# Patient Record
Sex: FEMALE | Race: WHITE
[De-identification: names, ages, dates, MRNs, and addresses within clinical notes are randomized per-mention and may not be internally consistent; named-entity substitution may affect disease eponyms.]

---

## 2017-08-25 ENCOUNTER — HOSPITAL ENCOUNTER (EMERGENCY)
Dept: HOSPITAL 31 - C.ER | Age: 27
Discharge: HOME | End: 2017-08-25
Payer: SELF-PAY

## 2017-08-25 VITALS — TEMPERATURE: 98.2 F

## 2017-08-25 VITALS — DIASTOLIC BLOOD PRESSURE: 87 MMHG | RESPIRATION RATE: 18 BRPM | SYSTOLIC BLOOD PRESSURE: 120 MMHG | HEART RATE: 70 BPM

## 2017-08-25 VITALS — OXYGEN SATURATION: 100 %

## 2017-08-25 DIAGNOSIS — O20.0: Primary | ICD-10-CM

## 2017-08-25 DIAGNOSIS — Z3A.01: ICD-10-CM

## 2017-08-25 LAB
ALBUMIN/GLOB SERPL: 1 {RATIO} (ref 1–2.1)
ALP SERPL-CCNC: 65 U/L (ref 38–126)
ALT SERPL-CCNC: 44 U/L (ref 9–52)
AST SERPL-CCNC: 32 U/L (ref 14–36)
BACTERIA #/AREA URNS HPF: (no result) /[HPF]
BASOPHILS # BLD AUTO: 0.1 K/UL (ref 0–0.2)
BASOPHILS NFR BLD: 0.6 % (ref 0–2)
BILIRUB SERPL-MCNC: 0.6 MG/DL (ref 0.2–1.3)
BILIRUB UR-MCNC: NEGATIVE MG/DL
BUN SERPL-MCNC: 10 MG/DL (ref 7–17)
CALCIUM SERPL-MCNC: 8.9 MG/DL (ref 8.6–10.4)
CHLORIDE SERPL-SCNC: 101 MMOL/L (ref 98–107)
CO2 SERPL-SCNC: 24 MMOL/L (ref 22–30)
EOSINOPHIL # BLD AUTO: 0.2 K/UL (ref 0–0.7)
EOSINOPHIL NFR BLD: 1.8 % (ref 0–4)
ERYTHROCYTE [DISTWIDTH] IN BLOOD BY AUTOMATED COUNT: 12.8 % (ref 11.5–14.5)
GLOBULIN SER-MCNC: 4 GM/DL (ref 2.2–3.9)
GLUCOSE SERPL-MCNC: 67 MG/DL (ref 65–105)
GLUCOSE UR STRIP-MCNC: NORMAL MG/DL
HCT VFR BLD CALC: 43.8 % (ref 34–47)
KETONES UR STRIP-MCNC: NEGATIVE MG/DL
LEUKOCYTE ESTERASE UR-ACNC: (no result) LEU/UL
LYMPHOCYTES # BLD AUTO: 2.9 K/UL (ref 1–4.3)
LYMPHOCYTES NFR BLD AUTO: 25.1 % (ref 20–40)
MCH RBC QN AUTO: 30.2 PG (ref 27–31)
MCHC RBC AUTO-ENTMCNC: 32.9 G/DL (ref 33–37)
MCV RBC AUTO: 91.8 FL (ref 81–99)
MONOCYTES # BLD: 0.9 K/UL (ref 0–0.8)
MONOCYTES NFR BLD: 8 % (ref 0–10)
NRBC BLD AUTO-RTO: 0 % (ref 0–2)
PH UR STRIP: 7 [PH] (ref 5–8)
PLATELET # BLD: 292 K/UL (ref 130–400)
PMV BLD AUTO: 8.7 FL (ref 7.2–11.7)
POTASSIUM SERPL-SCNC: 4.3 MMOL/L (ref 3.6–5.2)
PROT SERPL-MCNC: 8.1 G/DL (ref 6.3–8.3)
PROT UR STRIP-MCNC: NEGATIVE MG/DL
RBC # UR STRIP: (no result) /UL
RBC #/AREA URNS HPF: < 1 /HPF (ref 0–3)
SODIUM SERPL-SCNC: 137 MMOL/L (ref 132–148)
SP GR UR STRIP: 1.01 (ref 1–1.03)
TRANS CELLS #/AREA URNS HPF: < 1 /HPF (ref 0–3)
UROBILINOGEN UR-MCNC: NORMAL MG/DL (ref 0.2–1)
WBC # BLD AUTO: 11.5 K/UL (ref 4.8–10.8)
WBC #/AREA URNS HPF: < 1 /HPF (ref 0–5)

## 2017-08-25 NOTE — US
EXAM:

  US Pregnancy, Transvaginal



EXAM DATE/TIME:

  8/25/2017 9:18 PM



CLINICAL HISTORY:

  27 years old, female; Signs and symptoms; Lmp or gestational age (in weeks): 

? 6/2017; Antepartum complications; Bleeding; Pregnant; Additional info: Vag 

bleed 8 weeks preg



TECHNIQUE:

  Real-time transvaginal obstetrical ultrasound of the maternal pelvis and a 

first trimester pregnancy with image documentation.  Transvaginal imaging was 

used for better evaluation of the fetus and adnexa.



COMPARISON:

  No relevant prior studies available.



FINDINGS:



  The uterus measures approximately 9 x 4 x 5 cm. The cervix measures 3.3 cm.



   There is an intrauterine gestational sac containing a yolk sac and fetal 

pole.



  Gestational measurements correspond to a gestational age of 6 weeks 4 days.  



  Crown rump length measurements correspond to a gestational age of 6 weeks 1 

day.  



   No fetal heart rate was obtained.



  There is a small hypoechoic structure adjacent to the gestational sac likely 

representing a subchorionic bleed.  Followup is recommended.



  The maternal ovaries are normal bilaterally.



  Color flow and doppler vascular waveforms were demonstrated to both ovaries.





IMPRESSION:   



Intrauterine gestational sac containing yolk sac and fetal pole however no 

fetal heart rate at this time. Recommend short term followup beta-hCG levels 

and ultrasound.



Probable small subchorionic bleed.
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
## 2017-08-31 ENCOUNTER — HOSPITAL ENCOUNTER (EMERGENCY)
Dept: HOSPITAL 31 - C.ER | Age: 27
Discharge: HOME | End: 2017-08-31
Payer: COMMERCIAL

## 2017-08-31 VITALS — OXYGEN SATURATION: 99 % | DIASTOLIC BLOOD PRESSURE: 72 MMHG | HEART RATE: 85 BPM | SYSTOLIC BLOOD PRESSURE: 123 MMHG

## 2017-08-31 VITALS — RESPIRATION RATE: 16 BRPM | TEMPERATURE: 98.2 F

## 2017-08-31 VITALS — BODY MASS INDEX: 27.6 KG/M2

## 2017-08-31 DIAGNOSIS — O03.9: Primary | ICD-10-CM

## 2017-08-31 LAB
APTT BLD: 28 SECONDS (ref 21–34)
BACTERIA #/AREA URNS HPF: (no result) /[HPF]
BASOPHILS # BLD AUTO: 0.1 K/UL (ref 0–0.2)
BASOPHILS NFR BLD: 0.8 % (ref 0–2)
BILIRUB UR-MCNC: NEGATIVE MG/DL
BUN SERPL-MCNC: 12 MG/DL (ref 7–17)
CALCIUM SERPL-MCNC: 8.6 MG/DL (ref 8.6–10.4)
CHLORIDE SERPL-SCNC: 101 MMOL/L (ref 98–107)
CO2 SERPL-SCNC: 25 MMOL/L (ref 22–30)
EOSINOPHIL # BLD AUTO: 0.2 K/UL (ref 0–0.7)
EOSINOPHIL NFR BLD: 1.8 % (ref 0–4)
ERYTHROCYTE [DISTWIDTH] IN BLOOD BY AUTOMATED COUNT: 12.8 % (ref 11.5–14.5)
GLUCOSE SERPL-MCNC: 85 MG/DL (ref 65–105)
GLUCOSE UR STRIP-MCNC: NORMAL MG/DL
HCT VFR BLD CALC: 33.9 % (ref 34–47)
INR PPP: 1.1
KETONES UR STRIP-MCNC: NEGATIVE MG/DL
LEUKOCYTE ESTERASE UR-ACNC: (no result) LEU/UL
LYMPHOCYTES # BLD AUTO: 2.1 K/UL (ref 1–4.3)
LYMPHOCYTES NFR BLD AUTO: 23.2 % (ref 20–40)
MCH RBC QN AUTO: 30.6 PG (ref 27–31)
MCHC RBC AUTO-ENTMCNC: 33.7 G/DL (ref 33–37)
MCV RBC AUTO: 91 FL (ref 81–99)
MONOCYTES # BLD: 0.7 K/UL (ref 0–0.8)
MONOCYTES NFR BLD: 7.8 % (ref 0–10)
NRBC BLD AUTO-RTO: 0.1 % (ref 0–2)
PH UR STRIP: 6 [PH] (ref 5–8)
PLATELET # BLD: 255 K/UL (ref 130–400)
PMV BLD AUTO: 8.5 FL (ref 7.2–11.7)
POTASSIUM SERPL-SCNC: 3.6 MMOL/L (ref 3.6–5.2)
PROT UR STRIP-MCNC: (no result) MG/DL
RBC # UR STRIP: (no result) /UL
RBC #/AREA URNS HPF: 5 /HPF (ref 0–3)
SODIUM SERPL-SCNC: 137 MMOL/L (ref 132–148)
SP GR UR STRIP: 1.02 (ref 1–1.03)
TRANS CELLS #/AREA URNS HPF: < 1 /HPF (ref 0–3)
UROBILINOGEN UR-MCNC: NORMAL MG/DL (ref 0.2–1)
WBC # BLD AUTO: 9 K/UL (ref 4.8–10.8)
WBC #/AREA URNS HPF: 5 /HPF (ref 0–5)

## 2017-08-31 PROCEDURE — 80048 BASIC METABOLIC PNL TOTAL CA: CPT

## 2017-08-31 PROCEDURE — 81001 URINALYSIS AUTO W/SCOPE: CPT

## 2017-08-31 PROCEDURE — 86900 BLOOD TYPING SEROLOGIC ABO: CPT

## 2017-08-31 PROCEDURE — 82948 REAGENT STRIP/BLOOD GLUCOSE: CPT

## 2017-08-31 PROCEDURE — 76856 US EXAM PELVIC COMPLETE: CPT

## 2017-08-31 PROCEDURE — 84703 CHORIONIC GONADOTROPIN ASSAY: CPT

## 2017-08-31 PROCEDURE — 96360 HYDRATION IV INFUSION INIT: CPT

## 2017-08-31 PROCEDURE — 85730 THROMBOPLASTIN TIME PARTIAL: CPT

## 2017-08-31 PROCEDURE — 86850 RBC ANTIBODY SCREEN: CPT

## 2017-08-31 PROCEDURE — 85610 PROTHROMBIN TIME: CPT

## 2017-08-31 PROCEDURE — 84702 CHORIONIC GONADOTROPIN TEST: CPT

## 2017-08-31 PROCEDURE — 85025 COMPLETE CBC W/AUTO DIFF WBC: CPT

## 2017-08-31 PROCEDURE — 76830 TRANSVAGINAL US NON-OB: CPT

## 2017-08-31 PROCEDURE — 99285 EMERGENCY DEPT VISIT HI MDM: CPT

## 2017-08-31 NOTE — US
Indication: Vaginal bleeding 



Comparison: None available 



Technique: Real-time transabdominal pelvic ultrasound was performed. 

In addition a transvaginal pelvic ultrasound was necessary to better 

depict pelvic anatomy. 



Findings: 



No evidence of intrauterine gestational sac. Indeterminate hypoechoic 

heterogeneous 0.7 x 0.5 x 0.6 cm focus likely at the level of the 

endometrium. 



The right ovary measures 3.6 x 1.8 x 3.3 cm. The left ovary measures 

3.5 x 1.8 x 2.3 cm. Blood flow was demonstrated to both ovaries. 



Impression: 



Intrauterine gestational sac is no longer identified. Correlate 

clinically including beta HCG. 



Indeterminate hypoechoic heterogeneous 0.7 x 0.5 x 0.6 cm focus 

likely at the level of the endometrium. Considerations include but 

not limited to blood products or fibroid.

## 2017-08-31 NOTE — C.PDOC
History Of Present Illness


28 yo female w/o significant PMHx, , LNMP 17, come in for evaluation 

of sudden onset of diffuse lower abdominal cramping pain associated with 

diffuse vaginal bleeding. Pt admits," noted passing big clots last night" . At 

present time, pt sts, vaginal bleeding subsided, " vaginal spotting now with 

mild low abdominal cramping". Pt sts, last night felt weak, dizzy when noted 

severe vaginal bleeding, almost passed out". At present time, pt appears 

comfortable, not in any apparent distress. Pt admits, had one prenatal care 

visit.


Time Seen by Provider: 17 14:23


Chief Complaint (Nursing): Dizziness/Lightheaded


History Per: Patient





Past Medical History


Reviewed: Historical Data, Nursing Documentation, Vital Signs


Vital Signs: 


 Last Vital Signs











Temp  98.2 F   17 14:18


 


Pulse  84   17 14:18


 


Resp  16   17 14:18


 


BP  128/90   17 14:18


 


Pulse Ox  98   17 15:22














- Medical History


PMH: No Chronic Diseases





- [facility] Procedures








PRESSURE DRESSING APPLIC (01/02/15)








Family History: States: No Known Family Hx





- Social History


Hx Alcohol Use: Yes


Hx Substance Use: No





- Immunization History


Hx Tetanus Toxoid Vaccination: No


Hx Influenza Vaccination: No


Hx Pneumococcal Vaccination: No





Review Of Systems


Except As Marked, All Systems Reviewed And Found Negative.


Constitutional: Positive for: Weakness.  Negative for: Fever, Chills


ENT: Negative for: Throat Pain, Throat Swelling


Cardiovascular: Negative for: Chest Pain, Palpitations, Edema, Light Headedness


Respiratory: Negative for: Cough, Shortness of Breath


Gastrointestinal: Positive for: Abdominal Pain.  Negative for: Nausea, Vomiting

, Diarrhea


Genitourinary: Positive for: Vaginal Bleeding.  Negative for: Dysuria, Frequency

, Incontinence


Musculoskeletal: Negative for: Neck Pain, Back Pain


Neurological: Positive for: Dizziness.  Negative for: Altered Mental Status





Physical Exam





- Physical Exam


Appears: Well, Non-toxic, No Acute Distress


Skin: Normal Color, Warm, Dry


Head: Normacephalic


Eye(s): bilateral: PERRL


Nose: No Flaring


Oral Mucosa: Moist, No Drooling


Throat: No Erythema, No Drooling


Neck: Trachea Midline, Supple


Cardiovascular: Rhythm Regular


Respiratory: No Decreased Breath Sounds, No Accessory Muscle Use, No Stridor, 

No Wheezing


Gastrointestinal/Abdominal: Soft, No Tenderness, No Distention, No Guarding


Back: No CVA Tenderness


Extremity: Normal ROM, No Pedal Edema, No Deformity


Neurological/Psych: Oriented x3, Normal Speech





ED Course And Treatment





- Laboratory Results


Result Diagrams: 


 17 15:53





 17 15:53


Lab Interpretation: No Acute Changes


Urine Pregnancy POC: Positive


O2 Sat by Pulse Oximetry: 98


Pulse Ox Interpretation: Normal


Progress Note: On re-evaluation, pt is afebrile, hemodynamicaly stable.  non-

toxic.  PulseOx 98% RA.  neck: Supple, (-) JVD, (-) carotid bruits.  ENT: no 

acute findings.  Lungs: CTA B/L, BS equal B/L.  Abd: benign, (-) guarding, (-) 

rebound.  Back: (-) CVA tenderness.  Diagnostics review and appears normal.  

Beta quant 3,040 c/w 5-6 wks pregnancy.  Type/screen: A positive Ab negative.  

Us results review and no intrauterine gestation sac identified.  Results review 

and discussed with pt. Pt sts, was seen in clinic few days ago when was told 

her beta quant was 19, 000.  Pt has clinical findings likely c/w spontaneous 

.  Pt advised return to ED in 2 days for re-eavl.and beta repeat.  

return to ED if anyw orsening or new changes.  Pt understand, stable for 

discharge now.





Disposition


Counseled Patient/Family Regarding: Studies Performed, Diagnosis, Need For 

Followup





- Disposition


Referrals: 


[facility] [Outside]


Disposition: HOME/ ROUTINE


Disposition Time: 16:49


Condition: STABLE


Additional Instructions: 


RETURN TO ED IN 2 DAYS TO REPEAT BETA QUANT





ENCOURAGE FLUIDS





RETURN  TO ED AT ANT TIME IF ANY WORSENING.


Instructions:  Spontaneous Miscarriage (ED)


Forms:  [facility] (English)





- Clinical Impression


Clinical Impression: 


 Spontaneous

## 2017-09-11 ENCOUNTER — HOSPITAL ENCOUNTER (EMERGENCY)
Dept: HOSPITAL 31 - C.ER | Age: 27
Discharge: HOME | End: 2017-09-11
Payer: COMMERCIAL

## 2017-09-11 VITALS — BODY MASS INDEX: 27.6 KG/M2

## 2017-09-11 VITALS — SYSTOLIC BLOOD PRESSURE: 110 MMHG | HEART RATE: 82 BPM | DIASTOLIC BLOOD PRESSURE: 67 MMHG

## 2017-09-11 VITALS — TEMPERATURE: 98.6 F | RESPIRATION RATE: 20 BRPM

## 2017-09-11 DIAGNOSIS — S93.402A: ICD-10-CM

## 2017-09-11 DIAGNOSIS — S43.401A: Primary | ICD-10-CM
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
## 2017-09-11 NOTE — RAD
Left ankle three views 



History: Pain. 



Comparison: None available. 



Findings: 



Prominent lateral malleolar soft tissue swelling. 



No evidence of acute displaced fracture or dislocation. 



Impression: 



Prominent lateral malleolar soft tissue swelling.  If pain persists, 

consider MRI.
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
## 2017-09-14 VITALS — OXYGEN SATURATION: 98 %

## 2018-06-17 ENCOUNTER — HOSPITAL ENCOUNTER (EMERGENCY)
Dept: HOSPITAL 31 - C.ER | Age: 28
LOS: 1 days | Discharge: HOME | End: 2018-06-18
Payer: COMMERCIAL

## 2018-06-17 VITALS
SYSTOLIC BLOOD PRESSURE: 114 MMHG | HEART RATE: 97 BPM | OXYGEN SATURATION: 97 % | DIASTOLIC BLOOD PRESSURE: 75 MMHG | TEMPERATURE: 97.8 F

## 2018-06-17 VITALS — BODY MASS INDEX: 27.6 KG/M2

## 2018-06-17 DIAGNOSIS — S05.01XA: ICD-10-CM
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
## 2018-06-17 DIAGNOSIS — S05.02XA: Primary | ICD-10-CM

## 2018-06-18 VITALS — RESPIRATION RATE: 20 BRPM

## 2018-06-18 NOTE — C.PDOC
History Of Present Illness


28 year old female presents to the ED c/o bilateral eye imitation, redness that 

started today. Patient states she accidentally applied skin toner in her eyes 

and has been irrigating since with minimal relief. Patient is also c/o blurry 

vision, FB sensation and photophobia. Patient denies injury, fall, trauma, 

headache. 


Time Seen by Provider: 06/17/18 23:54


Chief Complaint (Nursing): Eye Problem


History Per: Patient


History/Exam Limitations: no limitations


Onset/Duration Of Symptoms: Hrs


Current Symptoms Are (Timing): Still Present


Quality: Burning, "Pain"


Wears Contact Lens?: No


Associated Symptoms: Decreased Vision, FB Sensation, Itching


Recent travel outside of the [place]: No


Additional History Per: Patient





Past Medical History


Reviewed: Historical Data, Nursing Documentation, Vital Signs


Vital Signs: 


 Last Vital Signs











Temp  97.8 F   06/17/18 23:31


 


Pulse  97 H  06/17/18 23:31


 


Resp  20   06/18/18 00:22


 


BP  114/75   06/17/18 23:31


 


Pulse Ox  97   06/18/18 02:22














- Medical History


PMH: No Chronic Diseases


Surgical History: No Surg Hx





- [facility] Procedures








PRESSURE DRESSING APPLIC (01/02/15)








Family History: States: Unknown Family Hx





- Social History


Hx Alcohol Use: Yes


Hx Substance Use: No





- Immunization History


Hx Tetanus Toxoid Vaccination: No


Hx Influenza Vaccination: No


Hx Pneumococcal Vaccination: No





Review Of Systems


Constitutional: Negative for: Fever, Chills


Eyes: Positive for: Pain, Conjunctivae Inflammation, Redness


Cardiovascular: Negative for: Chest Pain


Respiratory: Negative for: Cough, Shortness of Breath


Gastrointestinal: Negative for: Nausea, Vomiting


Skin: Negative for: Rash


Neurological: Negative for: Headache





Physical Exam





- Physical Exam


Appears: Non-toxic, No Acute Distress


Skin: Normal Color, Warm, Dry


Head: Atraumatic, Normacephalic


Eye(s): bilateral: PERRL, EOMI, Other (conjunctival ciliary injection. circular 

abrasions over the pupils. No FB noted. )


Ear(s): Bilateral: Normal


Nose: No Discharge


Oral Mucosa: Moist


Throat: Normal


Neck: Normal ROM, Supple


Extremity: Normal ROM, No Tenderness, No Swelling


Neurological/Psych: Oriented x3, Normal Speech


Gait: Steady





ED Course And Treatment


O2 Sat by Pulse Oximetry: 97 (ON RA)


Pulse Ox Interpretation: Normal


Progress Note: Plan:  - Tobradex 1 mirza OU.  - Tylenol 975 mg PO.  Eyes 

irrigated.  Tobrex oint applied.  Patient was given the remaining Tobrex to use 

at home. Visual accuity after tetracaine was 20/40 bilateral. Patient was 

advised to follow up with Opthalmologist tomorrow.





Disposition


Counseled Patient/Family Regarding: Diagnosis, Need For Followup, Rx Given





- Disposition


Referrals: 


Lee Gardner [Staff Provider] - 


Disposition: HOME/ ROUTINE


Disposition Time: 00:10


Condition: STABLE


Additional Instructions: 


Tylenol extra strength for pain 





Apply cold compress to eyes or wash with cool water





Apply tobrex ointment to area ( was given to you)





Call Dr Mcadams in AM for eye evaluation





Return to ER if worse 


Instructions:  Corneal Abrasion (DC)


Forms:  [facility] (English), Work Excuse





- Clinical Impression


Clinical Impression: 


 Corneal abrasion of both eyes








- PA / NP / Resident Statement


MD/DO has reviewed & agrees with the documentation as recorded.





- Scribe Statement


The provider has reviewed the documentation as recorded by the Scribe


Kyler Villar





All medical record entries made by the Scribe were at my direction and 

personally dictated by me. I have reviewed the chart and agree that the record 

accurately reflects my personal performance of the history, physical exam, 

medical decision making, and the department course for this patient. I have 

also personally directed, reviewed, and agree with the discharge instructions 

and disposition.